# Patient Record
Sex: FEMALE | URBAN - NONMETROPOLITAN AREA
[De-identification: names, ages, dates, MRNs, and addresses within clinical notes are randomized per-mention and may not be internally consistent; named-entity substitution may affect disease eponyms.]

---

## 2019-03-01 ENCOUNTER — APPOINTMENT (RX ONLY)
Dept: URBAN - NONMETROPOLITAN AREA CLINIC 1 | Facility: CLINIC | Age: 59
Setting detail: DERMATOLOGY
End: 2019-03-01

## 2019-03-01 DIAGNOSIS — L71.8 OTHER ROSACEA: ICD-10-CM

## 2019-03-01 PROCEDURE — 99212 OFFICE O/P EST SF 10 MIN: CPT

## 2019-03-01 PROCEDURE — ? PRESCRIPTION

## 2019-03-01 PROCEDURE — ? COUNSELING

## 2019-03-01 RX ORDER — METRONIDAZOLE 7.5 MG/G
CREAM TOPICAL
Qty: 1 | Refills: 3 | Status: ERX | COMMUNITY
Start: 2019-03-01

## 2019-03-01 RX ADMIN — METRONIDAZOLE 1: 7.5 CREAM TOPICAL at 00:00

## 2019-03-01 ASSESSMENT — LOCATION SIMPLE DESCRIPTION DERM: LOCATION SIMPLE: LEFT CHEEK

## 2019-03-01 ASSESSMENT — LOCATION ZONE DERM: LOCATION ZONE: FACE

## 2019-03-01 ASSESSMENT — LOCATION DETAILED DESCRIPTION DERM: LOCATION DETAILED: LEFT CENTRAL MALAR CHEEK

## 2019-03-01 NOTE — HPI: RASH
How Severe Is Your Rash?: moderate
Is This A New Presentation, Or A Follow-Up?: Rash
Additional History: Flaring recently.

## 2019-03-01 NOTE — PROCEDURE: COUNSELING
Detail Level: Zone
Patient Specific Counseling (Will Not Stick From Patient To Patient): Advised patient to call if she changes her mind about trying oral antibiotics for quicker improvement.

## 2019-08-21 ENCOUNTER — APPOINTMENT (OUTPATIENT)
Dept: RADIOLOGY | Facility: MEDICAL CENTER | Age: 59
End: 2019-08-21
Attending: EMERGENCY MEDICINE
Payer: COMMERCIAL

## 2019-08-21 ENCOUNTER — HOSPITAL ENCOUNTER (EMERGENCY)
Facility: MEDICAL CENTER | Age: 59
End: 2019-08-21
Attending: EMERGENCY MEDICINE
Payer: COMMERCIAL

## 2019-08-21 VITALS
WEIGHT: 220 LBS | RESPIRATION RATE: 16 BRPM | OXYGEN SATURATION: 95 % | BODY MASS INDEX: 30.8 KG/M2 | HEART RATE: 80 BPM | TEMPERATURE: 98.4 F | DIASTOLIC BLOOD PRESSURE: 59 MMHG | HEIGHT: 71 IN | SYSTOLIC BLOOD PRESSURE: 141 MMHG

## 2019-08-21 DIAGNOSIS — S06.0X1A CONCUSSION WITH LOSS OF CONSCIOUSNESS OF 30 MINUTES OR LESS, INITIAL ENCOUNTER: ICD-10-CM

## 2019-08-21 PROCEDURE — 305948 HCHG GREEN TRAUMA ACT PRE-NOTIFY NO CC

## 2019-08-21 PROCEDURE — 70450 CT HEAD/BRAIN W/O DYE: CPT

## 2019-08-21 PROCEDURE — 71045 X-RAY EXAM CHEST 1 VIEW: CPT

## 2019-08-21 PROCEDURE — 99284 EMERGENCY DEPT VISIT MOD MDM: CPT

## 2019-08-21 PROCEDURE — 73030 X-RAY EXAM OF SHOULDER: CPT | Mod: RT

## 2019-08-21 PROCEDURE — 72170 X-RAY EXAM OF PELVIS: CPT

## 2019-08-22 NOTE — DISCHARGE INSTRUCTIONS
Your x-rays and her head CT were normal.  There is no evidence of any significant injury.  You are experiencing some concussion symptoms, which should gradually fade away.  You will most likely require extra sleep, and he may have some difficulty concentrating as well as other symptoms.  It is very important that you avoid any possible head injury until you are feeling completely back to normal, and that you schedule follow-up with your primary care doctor.

## 2019-08-22 NOTE — ED PROVIDER NOTES
ED Provider Note    Scribed for Sebastien Cox M.D. by Sebastien Cox. 8/21/2019,  9:17 PM.    CHIEF COMPLAINT  Chief Complaint   Patient presents with   • Trauma Green     Pt trampled by horse, +LOC ~30 seconds       HPI  Gogo David is a 58 y.o. female who presents to the Emergency Department after an accident involving her daughters for us.  In contrast to the triage note, there is no evidence or reports that she was trampled by a horse.  She is amnestic for the event.  Her  is here, and he became aware that the horse had startled and went away, and turned around to see his wife lying for loss of consciousness for about 30 seconds.  He reports that she seemed confused at first, but seems normal now.  She arrives by EMS, and they reported some repetitive questioning at first, giving her a GCS of 14, but repetitive questioning resolved, and they gave her a GCS of 15 on arrival.  Her only complaint is a dull headache.  She does not have blurry vision or any more confusion.  She denies pain to her neck or back, chest or abdomen or extremities except for a 2 out of 10 dull pain to the anterior right shoulder.  The patient and  and EMS his best guess is that the patient, who was not riding a horse or kicked by the horse, was actually knocked out by the horses had hitting hers as it started and ran away, since the patient was exchanging the horses halter for the horses bridle at the time of the accident.    REVIEW OF SYSTEMS  See HPI for further details. All other systems are negative.     PAST MEDICAL HISTORY   No easy bleeding or bruising, no anticoagulation    SOCIAL HISTORY  Social History     Tobacco Use   • Smoking status: Never Smoker   • Smokeless tobacco: Never Used   Substance and Sexual Activity   • Alcohol use: Not Currently   • Drug use: Never   • Sexual activity: Not on file     Social History     Substance and Sexual Activity   Drug Use Never       SURGICAL HISTORY  patient  "denies any surgical history    CURRENT MEDICATIONS  Home Medications     Reviewed by Kyrie Polanco R.N. (Registered Nurse) on 08/21/19 at 2058  Med List Status: Partial   Medication Last Dose Status        Patient Jose Taking any Medications                       ALLERGIES  No Known Allergies    PRIMARY SURVEY:    Airway: Phonating well,clear  Breathing: Equal breath sounds bilaterally  Circulation: Normal heart sounds 2+ pulses at bilateral radial and femoral arteries  Disability:  GCS 15  Exposure: No gross deformity or hemorrhage    /59   Pulse 80   Temp 36.9 °C (98.4 °F)   Resp 16   Ht 1.803 m (5' 11\")   Wt 99.8 kg (220 lb)   SpO2 95%     Secondary Survey:    Constitutional: Awake, alert, oriented x3.    Heent: Head is normocephalic, atraumatic Pupils 3mm reactive bilaterally. Midface stable. No malocclusion.  No hemotympanum bilaterally. No septal hematoma.  Neck: No tracheal deviation. No midline cervical spine tenderness.   Cardiovascular: Regular rate and rhythm no murmur rub or gallop intact distal pulses peripherally x4  Pulmonary/Chest: Clavicles nontender to palpation. There is/is not any chest wall tenderness bilaterally.  No crepitus. Positive breath sounds bilaterally.   Abdominal: Soft, nondistended. Nontender to palpation. Pelvis is stable to AP and lateral compression. No seatbelt sign.   Musculoskeletal: Right upper extremity atraumatic, though there is mild tenderness to the anterior right shoulder without visible or palpable deformity, palpable radial pulse. 5/5  strength. Full ROM and strength at elbow.  Left upper extremity atraumatic, palpable radial pulse. 5/5  strength. Full ROM and strength at elbow.  Right lower extremity atraumatic. 5/5 strength in ankle plantar flexion and dorsiflexion. No pain and full ROM at right knee and hip.   Left  lower extremity atraumatic. 5/5 strength in ankle plantar flexion and dorsiflexion. No pain and full ROM at left knee and hip. "   Back: Midline thoracic and lumbar spines are nontender to palpation. No step-offs. Mild sacral erythema present.  : deferred  Neurological: Sensation intact to light touch dorsum and plantar surfaces of both feet and the medial and lateral aspects of both lower legs.  Sensation intact to light touch dorsum and plantar surfaces of both hands.   Skin: Skin is warm and dry.  No diaphoresis. No erythema. No pallor.  Very subtle abrasion to the anterior left shin without tenderness or deformity  Psychiatric:  Normal mood and affect for the situation.  Behavior is appropriate.       DIAGNOSTIC STUDIES / PROCEDURES    LABS  Labs Reviewed - No data to display  All labs reviewed by me.    RADIOLOGY  DX-SHOULDER 2+ RIGHT   Final Result      Negative right shoulder series      DX-PELVIS-1 OR 2 VIEWS   Final Result      No pelvic or proximal femoral fracture identified      DX-CHEST-LIMITED (1 VIEW)   Final Result      No acute cardiopulmonary abnormality identified.      CT-HEAD W/O   Final Result      1.  No acute intracranial abnormality      2.  Small amount of fluid within the left maxillary sinus      3.  Incomplete fusion of C1 posterior ring, anatomic variant        The radiologist's interpretation of all radiological studies have been reviewed by me.    COURSE & MEDICAL DECISION MAKING  Nursing notes, VS [id number] reviewed in chart.     9:17 PM Patient seen and examined at bedside.  She has no evidence of significant trauma, but had a loss of consciousness after some injury with a horse, likely striking head-to-head.  However, the details of the accident are unclear, so I think it is reasonable to obtain a head CT in addition to an x-ray have her chest and pelvis and shoulder.    I returned to the bedside to discuss with the patient that her imaging tests were unremarkable.  She is feeling much better, and is now smiling, does not appear fearful or shaken, and is interacting pleasantly with staff and her .  We  discussed concussion symptoms, return precautions, and gradual return to normal activities, and emphasized the importance of avoiding any situation which carries significant risk for head injury, including dealing directly with horses, until she is feeling completely better.     The patient will return for new or worsening symptoms and is stable at the time of discharge.    The patient is referred to a primary physician for blood pressure management, diabetic screening, and for all other preventative health concerns.    DISPOSITION:  Patient will be discharged home in stable condition.    FOLLOW UP:  Your primary care doctor.    Schedule an appointment as soon as possible for a visit       [facility], Emergency Dept  [address] [place] [id number] [phone]    If symptoms worsen      OUTPATIENT MEDICATIONS:  There are no discharge medications for this patient.          FINAL IMPRESSION  1. Concussion with loss of consciousness of 30 minutes or less, initial encounter

## 2019-08-22 NOTE — ED TRIAGE NOTES
"Gogo Seventy-Two  58 y.o. female  Chief Complaint   Patient presents with   • Trauma Green     Pt trampled by horse, +LOC ~30 seconds       Pt BIB [facility] as a Trauma Green for above CC.    Prior to arrival, pt recieved PIV placement, 100 cc fluid.     Per ERP assessment, pt was found to have the following abnormalities: right shoulder tenderness, left shin abrasion.     Pt received, chest, pelvis and right shoulder xray in the trauma bay.     Report given to Barbara AQUINO.     Blood Pressure: 115/48, Pulse: 86, Respiration: 16, Temperature: 36.9 °C (98.4 °F), Height: 180.3 cm (5' 11\"), Weight: 99.8 kg (220 lb), BMI (Calculated): 30.68, BSA (Calculated): 2.2, Pulse Oximetry: 97 %, O2 (LPM): 0, O2 Delivery: None (Room Air)    "

## 2024-08-13 ENCOUNTER — APPOINTMENT (RX ONLY)
Dept: URBAN - METROPOLITAN AREA CLINIC 6 | Facility: CLINIC | Age: 64
Setting detail: DERMATOLOGY
End: 2024-08-13

## 2024-08-13 DIAGNOSIS — L82.0 INFLAMED SEBORRHEIC KERATOSIS: ICD-10-CM

## 2024-08-13 DIAGNOSIS — L91.8 OTHER HYPERTROPHIC DISORDERS OF THE SKIN: ICD-10-CM

## 2024-08-13 DIAGNOSIS — D22 MELANOCYTIC NEVI: ICD-10-CM

## 2024-08-13 DIAGNOSIS — Z71.89 OTHER SPECIFIED COUNSELING: ICD-10-CM

## 2024-08-13 DIAGNOSIS — L82.1 OTHER SEBORRHEIC KERATOSIS: ICD-10-CM

## 2024-08-13 DIAGNOSIS — D18.0 HEMANGIOMA: ICD-10-CM

## 2024-08-13 DIAGNOSIS — L81.4 OTHER MELANIN HYPERPIGMENTATION: ICD-10-CM

## 2024-08-13 PROBLEM — D18.01 HEMANGIOMA OF SKIN AND SUBCUTANEOUS TISSUE: Status: ACTIVE | Noted: 2024-08-13

## 2024-08-13 PROBLEM — D22.5 MELANOCYTIC NEVI OF TRUNK: Status: ACTIVE | Noted: 2024-08-13

## 2024-08-13 PROBLEM — D22.71 MELANOCYTIC NEVI OF RIGHT LOWER LIMB, INCLUDING HIP: Status: ACTIVE | Noted: 2024-08-13

## 2024-08-13 PROBLEM — D22.72 MELANOCYTIC NEVI OF LEFT LOWER LIMB, INCLUDING HIP: Status: ACTIVE | Noted: 2024-08-13

## 2024-08-13 PROBLEM — D22.62 MELANOCYTIC NEVI OF LEFT UPPER LIMB, INCLUDING SHOULDER: Status: ACTIVE | Noted: 2024-08-13

## 2024-08-13 PROBLEM — D22.61 MELANOCYTIC NEVI OF RIGHT UPPER LIMB, INCLUDING SHOULDER: Status: ACTIVE | Noted: 2024-08-13

## 2024-08-13 PROCEDURE — ? COUNSELING

## 2024-08-13 PROCEDURE — ? BENIGN DESTRUCTION

## 2024-08-13 PROCEDURE — 99203 OFFICE O/P NEW LOW 30 MIN: CPT | Mod: 25

## 2024-08-13 PROCEDURE — ? SUNSCREEN RECOMMENDATIONS

## 2024-08-13 PROCEDURE — ? LIQUID NITROGEN

## 2024-08-13 PROCEDURE — 17110 DESTRUCTION B9 LES UP TO 14: CPT

## 2024-08-13 ASSESSMENT — LOCATION DETAILED DESCRIPTION DERM
LOCATION DETAILED: RIGHT VENTRAL PROXIMAL FOREARM
LOCATION DETAILED: LEFT AXILLARY VAULT
LOCATION DETAILED: PERIUMBILICAL SKIN
LOCATION DETAILED: LEFT KNEE
LOCATION DETAILED: RIGHT ANTERIOR DISTAL UPPER ARM
LOCATION DETAILED: LEFT ANTERIOR PROXIMAL UPPER ARM
LOCATION DETAILED: LEFT VENTRAL PROXIMAL FOREARM
LOCATION DETAILED: RIGHT AXILLARY VAULT
LOCATION DETAILED: RIGHT AXILLARY VAULT
LOCATION DETAILED: LEFT ANTERIOR LATERAL PROXIMAL THIGH
LOCATION DETAILED: EPIGASTRIC SKIN
LOCATION DETAILED: RIGHT RIB CAGE
LOCATION DETAILED: RIGHT KNEE
LOCATION DETAILED: LEFT AXILLARY VAULT
LOCATION DETAILED: RIGHT PROXIMAL PRETIBIAL REGION
LOCATION DETAILED: RIGHT ANTERIOR DISTAL THIGH
LOCATION DETAILED: RIGHT ANTECUBITAL SKIN
LOCATION DETAILED: LEFT ANTERIOR DISTAL THIGH
LOCATION DETAILED: RIGHT ANTERIOR PROXIMAL UPPER ARM
LOCATION DETAILED: LEFT ANTERIOR DISTAL UPPER ARM
LOCATION DETAILED: LEFT PROXIMAL PRETIBIAL REGION
LOCATION DETAILED: RIGHT LATERAL INFERIOR EYELID
LOCATION DETAILED: RIGHT RIB CAGE
LOCATION DETAILED: LOWER STERNUM

## 2024-08-13 ASSESSMENT — LOCATION ZONE DERM
LOCATION ZONE: AXILLAE
LOCATION ZONE: LEG
LOCATION ZONE: EYELID
LOCATION ZONE: TRUNK
LOCATION ZONE: AXILLAE
LOCATION ZONE: ARM
LOCATION ZONE: TRUNK

## 2024-08-13 ASSESSMENT — LOCATION SIMPLE DESCRIPTION DERM
LOCATION SIMPLE: RIGHT INFERIOR EYELID
LOCATION SIMPLE: RIGHT UPPER ARM
LOCATION SIMPLE: LEFT THIGH
LOCATION SIMPLE: RIGHT KNEE
LOCATION SIMPLE: RIGHT AXILLARY VAULT
LOCATION SIMPLE: LEFT AXILLARY VAULT
LOCATION SIMPLE: RIGHT PRETIBIAL REGION
LOCATION SIMPLE: RIGHT THIGH
LOCATION SIMPLE: ABDOMEN
LOCATION SIMPLE: RIGHT AXILLARY VAULT
LOCATION SIMPLE: RIGHT FOREARM
LOCATION SIMPLE: ABDOMEN
LOCATION SIMPLE: LEFT PRETIBIAL REGION
LOCATION SIMPLE: LEFT KNEE
LOCATION SIMPLE: LEFT AXILLARY VAULT
LOCATION SIMPLE: CHEST
LOCATION SIMPLE: LEFT FOREARM
LOCATION SIMPLE: LEFT UPPER ARM

## 2024-08-13 NOTE — PROCEDURE: LIQUID NITROGEN
Consent: The patient's consent was obtained including but not limited to risks of crusting, scabbing, blistering, scarring, darker or lighter pigmentary change, recurrence, incomplete removal and infection.
Include Z78.9 (Other Specified Conditions Influencing Health Status) As An Associated Diagnosis?: No
Show Topical Anesthesia Variable?: Yes
Number Of Freeze-Thaw Cycles: 3 freeze-thaw cycles
Medical Necessity Clause: This procedure was medically necessary because the lesions that were treated were:
Post-Care Instructions: I reviewed with the patient in detail post-care instructions. Patient is to wear sunprotection, and avoid picking at any of the treated lesions. Pt may apply Vaseline to crusted or scabbing areas.
Detail Level: Detailed
Spray Paint Text: The liquid nitrogen was applied to the skin utilizing a spray paint frosting technique.
Medical Necessity Information: It is in your best interest to select a reason for this procedure from the list below. All of these items fulfill various CMS LCD requirements except the new and changing color options.

## 2024-08-13 NOTE — PROCEDURE: BENIGN DESTRUCTION
Medical Necessity Information: It is in your best interest to select a reason for this procedure from the list below. All of these items fulfill various CMS LCD requirements except the new and changing color options.
Render Post-Care Instructions In Note?: no
Treatment Number (Will Not Render If 0): 0
Detail Level: Detailed
Consent: The patient's consent was obtained including but not limited to risks of crusting, scabbing, blistering, scarring, darker or lighter pigmentary change, recurrence, incomplete removal and infection.
Medical Necessity Clause: This procedure was medically necessary because the lesions that were treated were:
Post-Care Instructions: I reviewed with the patient in detail post-care instructions. Patient is to wear sunprotection, and avoid picking at any of the treated lesions. Pt may apply Vaseline to crusted or scabbing areas.